# Patient Record
Sex: FEMALE | Race: BLACK OR AFRICAN AMERICAN | Employment: FULL TIME | URBAN - METROPOLITAN AREA
[De-identification: names, ages, dates, MRNs, and addresses within clinical notes are randomized per-mention and may not be internally consistent; named-entity substitution may affect disease eponyms.]

---

## 2018-10-29 ENCOUNTER — THERAPY VISIT (OUTPATIENT)
Dept: PHYSICAL THERAPY | Facility: CLINIC | Age: 43
End: 2018-10-29
Payer: OTHER MISCELLANEOUS

## 2018-10-29 DIAGNOSIS — M25.512 SHOULDER PAIN, LEFT: Primary | ICD-10-CM

## 2018-10-29 PROCEDURE — 97161 PT EVAL LOW COMPLEX 20 MIN: CPT | Mod: GP | Performed by: PHYSICAL THERAPIST

## 2018-10-29 PROCEDURE — 97110 THERAPEUTIC EXERCISES: CPT | Mod: GP | Performed by: PHYSICAL THERAPIST

## 2018-10-29 NOTE — PROGRESS NOTES
[place] [facility] Initial Evaluation  Subjective:  Patient is a 43 year old female presenting with rehab right shoulder hpi.   Christina Salazar is a 43 year old female with a right shoulder condition.  Condition occurred with:  Repetition/overuse (vacuuming).  Condition occurred: at work.  This is a new condition  10/11/18  .    Patient reports pain:  Lateral.  Radiates to:  Shoulder (anterior to posterior).  Quality: clicking/popping. and is constant and reported as 8/10.  Associated with: clicking, popping.   Exacerbated by: reaching above head, behind back, lifting, gripping. and relieved by ice, bracing/immobilizing and rest (sling).  Since onset symptoms are unchanged.  Special tests:  X-ray.      General health as reported by patient is good.  Pertinent medical history includes:  Anemia, depression and smoking (pain at night/rest).        Current occupation is   .            Red flags:  None as reported by the patient.        Pt reports she initially hurt her R elbow in March 2018 when she banged her elbow against a wall. THen on 10/11/18 the pt started vacuuming. When she pulled back she felt immediate sharp pain in the back/side of the shoulder. SHe felt that she pulled something in her shoulder. She had Xrays done that following Saturday, which were negative. SHe was then put in a sling and was told to rest her shoulder for one week. She is unable to return to work on any restrictions.                Objective:  System              Cervical/Thoracic Evaluation  Cervical AROM: normal (Negative Spurling's)                                  Shoulder Evaluation:  ROM:  AROM:  : Light resistance painful with: L shoulder flexion, ABD, IR.  Flexion:  Left:  180    Right:  115  Extension: Left: 180Right: 85                  Flexion/External Rotation:  Left:  T4    Right:  T4  Extension/Internal Rotation:  Left:  T6    Right:  T12            Stability Testing:      Left shoulder stability  negative testing:  Apprehension  Right shoulder stability positive testing:Apprehension  Special Tests:  Special tests assessed shoulder: ABle to perform lift off test for subscap.  Left shoulder positive for the following special tests:  Impingement  Right shoulder positive for the following special tests:Impingement  Palpation:  Palpation assessed shoulder: Diffuse tenderness t/o the shoulder, but very tender on biceps tendon, AC joint, posterior cuff, greater tuberosity.                                         General     ROS    Assessment/Plan:    Patient is a 43 year old female with right side shoulder complaints.    Patient has the following significant findings with corresponding treatment plan.                Diagnosis 1:  R shoulder pain  Pain -  hot/cold therapy  Decreased joint mobility - manual therapy and therapeutic exercise  Decreased strength - therapeutic exercise and therapeutic activities  Impaired muscle performance - neuro re-education  Decreased function - therapeutic activities  Impaired posture - neuro re-education    Therapy Evaluation Codes:   1) History comprised of:   Personal factors that impact the plan of care:      Work status.    Comorbidity factors that impact the plan of care are:      None.     Medications impacting care: None.  2) Examination of Body Systems comprised of:   Body structures and functions that impact the plan of care:      Shoulder.   Activity limitations that impact the plan of care are:      Working.  3) Clinical presentation characteristics are:   Stable/Uncomplicated.  4) Decision-Making    Low complexity using standardized patient assessment instrument and/or measureable assessment of functional outcome.  Cumulative Therapy Evaluation is: Low complexity.    Previous and current functional limitations:  (See Goal Flow Sheet for this information)    Short term and Long term goals: (See Goal Flow Sheet for this information)     Communication ability:  Patient  appears to be able to clearly communicate and understand verbal and written communication and follow directions correctly.  Treatment Explanation - The following has been discussed with the patient:   RX ordered/plan of care  Anticipated outcomes  Possible risks and side effects  This patient would benefit from PT intervention to resume normal activities.   Rehab potential is good.    Frequency:  1 X week, once daily  Duration:  for 6 weeks  Discharge Plan:  Achieve all LTG.  Independent in home treatment program.  Reach maximal therapeutic benefit.    Please refer to the daily flowsheet for treatment today, total treatment time and time spent performing 1:1 timed codes.

## 2018-10-29 NOTE — MR AVS SNAPSHOT
After Visit Summary   10/29/2018    Christina Salazar    MRN: [id number]           Patient Information     Date Of Birth          1975        Visit Information        Provider Department      10/29/2018 9:40 AM Cal Davis [facility]        Today's Diagnoses     Shoulder pain, left    -  1       Follow-ups after your visit        Your next 10 appointments already scheduled     Nov 09, 2018 11:00 AM CST   [facility] Extremity with [facility] ([facility])    [address] [place] [address] [phone]            Nov 16, 2018 11:00 AM CST   [facility] Extremity with Cal MONTEIRO [facility] ([facility])    [address] [place] [address] [phone]            Nov 23, 2018 11:00 AM CST   [facility] Extremity with [facility] ([facility])    [address] [place] [address] [phone]            Nov 30, 2018 11:00 AM CST   [facility] Extremity with Cal MONTEIRO [facility] ([facility])    [address] [place] [address] [phone]              Who to contact     If you have questions or need follow up information about today's clinic visit or your schedule please contact [facility] PHYSICAL THERAP[facility] directly at [phone].  Normal or non-critical lab and imaging results will be communicated to you by MyChart, letter or phone within 4 business days after the clinic has received the results. If you do not hear from us within 7 days, please contact the clinic through [email or web address] or phone. If you have a critical or abnormal lab result, we will notify you by phone as soon as possible.  Submit refill requests through [employer] or call your  "pharmacy and they will forward the refill request to us. Please allow 3 business days for your refill to be completed.          Additional Information About Your Visit        MyChart Information     [employer] lets you send messages to your doctor, view your test results, renew your prescriptions, schedule appointments and more. To sign up, go to [email or web address] . Click on \"Log in\" on the left side of the screen, which will take you to the Welcome page. Then click on \"Sign up Now\" on the right side of the page.     You will be asked to enter the access code listed below, as well as some personal information. Please follow the directions to create your username and password.     Your access code is: [email or web address]  Expires: 2019  6:31 AM     Your access code will  in 90 days. If you need help or a new code, please call your [place] clinic or [phone].        Care EveryWhere ID     This is your Care EveryWhere ID. This could be used by other organizations to access your [place] medical records  [id number]         Blood Pressure from Last 3 Encounters:   No data found for BP    Weight from Last 3 Encounters:   No data found for Wt              We Performed the Following     HC PT EVAL, LOW COMPLEXITY     [facility] INITIAL EVAL REPORT     THERAPEUTIC EXERCISES        Primary Care Provider Office Phone # Fax #    Mikki MONTEIRO AdrianaTyrone [phone]       [facility] [address] [facility] [address]        Equal Access to Services     LILLI VIRGEN : Hadii chapo ku hadasho Sofrancoiseali, waaxda luqadaha, qaybta kaalmada adeegyada, mercy nieto . So [facility] [phone].    ATENCIÓN: Si habla español, tiene a romero disposición servicios gratuitos de asistencia lingüística. Jazmyne al [phone].    We comply with applicable federal civil rights laws and [place] laws. We do not discriminate on the basis of race, color, national origin, age, disability, sex, " sexual orientation, or gender identity.            Thank you!     Thank you for choosing [facility] PHYSICAL THERAPY [facility]  for your care. Our goal is always to provide you with excellent care. Hearing back from our patients is one way we can continue to improve our services. Please take a few minutes to complete the written survey that you may receive in the mail after your visit with us. Thank you!             Your Updated Medication List - Protect others around you: Learn how to safely use, store and throw away your medicines at www.disposemymeds.org.      Notice  As of 10/29/2018  4:27 PM    You have not been prescribed any medications.

## 2018-10-29 NOTE — LETTER
HEALTH PHYSICAL THERAPY [facility]  [address] [address]  [phone]    2018    Re: Christina Salazar   :   1975  MRN:  [id number]   REFERRING PHYSICIAN:   No ref. provider found     HEALTH PHYSICAL THERAPY [facility]  Date of Initial Evaluation:  10/29/18  Visits:  Rxs Used: 1  Reason for Referral:  Shoulder pain, left    [place] [facility] Initial Evaluation  Subjective:  Patient is a 43 year old female presenting with rehab right shoulder hpi.   Christina Salazar is a 43 year old female with a right shoulder condition.  Condition occurred with:  Repetition/overuse (vacuuming).  Condition occurred: at work.  This is a new condition  10/11/18  Patient reports pain:  Lateral.  Radiates to:  Shoulder (anterior to posterior).  Quality: clicking/popping. and is constant and reported as 8/10.  Associated with: clicking, popping.   Exacerbated by: reaching above head, behind back, lifting, gripping. and relieved by ice, bracing/immobilizing and rest (sling).  Since onset symptoms are unchanged.  Special tests:  X-ray.      General health as reported by patient is good.  Pertinent medical history includes:  Anemia, depression and smoking (pain at night/rest).        Current occupation is   Red flags:  None as reported by the patient.    Pt reports she initially hurt her R elbow in 2018 when she banged her elbow against a wall. THen on 10/11/18 the pt started vacuuming. When she pulled back she felt immediate sharp pain in the back/side of the shoulder. SHe felt that she pulled something in her shoulder. She had Xrays done that following Saturday, which were negative. SHe was then put in a sling and was told to rest her shoulder for one week. She is unable to return to work on any restrictions.                Objective:  Cervical/Thoracic Evaluation  Cervical AROM: normal (Negative Spurling's)     Shoulder Evaluation:  ROM:  AROM:  : Light  resistance painful with: L shoulder flexion, ABD, IR.  Flexion:  Left:  180    Right:  115  Extension: Left: 180Right: 85  Flexion/External Rotation:  Left:  T4    Right:  T4  Extension/Internal Rotation:  Left:  T6    Right:  T12    Stability Testing:    Left shoulder stability negative testing:  Apprehension  Right shoulder stability positive testing:Apprehension  Special Tests:  Special tests assessed shoulder: ABle to perform lift off test for subscap.  Left shoulder positive for the following special tests:  Impingement  Re: Christina RE CrooksMarie   :   1975    Right shoulder positive for the following special tests:Impingement  Palpation:  Palpation assessed shoulder: Diffuse tenderness t/o the shoulder, but very tender on biceps tendon, AC joint, posterior cuff, greater tuberosity.    Assessment/Plan:    Patient is a 43 year old female with right side shoulder complaints.    Patient has the following significant findings with corresponding treatment plan.                Diagnosis 1:  R shoulder pain  Pain -  hot/cold therapy  Decreased joint mobility - manual therapy and therapeutic exercise  Decreased strength - therapeutic exercise and therapeutic activities  Impaired muscle performance - neuro re-education  Decreased function - therapeutic activities  Impaired posture - neuro re-education    Therapy Evaluation Codes:   1) History comprised of:   Personal factors that impact the plan of care:      Work status.    Comorbidity factors that impact the plan of care are:      None.     Medications impacting care: None.  2) Examination of Body Systems comprised of:   Body structures and functions that impact the plan of care:      Shoulder.   Activity limitations that impact the plan of care are:      Working.  3) Clinical presentation characteristics are:   Stable/Uncomplicated.  4) Decision-Making    Low complexity using standardized patient assessment instrument and/or measureable assessment of functional  outcome.  Cumulative Therapy Evaluation is: Low complexity.  Previous and current functional limitations:  (See Goal Flow Sheet for this information)    Short term and Long term goals: (See Goal Flow Sheet for this information)   Communication ability:  Patient appears to be able to clearly communicate and understand verbal and written communication and follow directions correctly.  Treatment Explanation - The following has been discussed with the patient:   RX ordered/plan of care  Anticipated outcomes  Possible risks and side effects  This patient would benefit from PT intervention to resume normal activities.   Rehab potential is good.  Frequency:  1 X week, once daily  Duration:  for 6 weeks  Discharge Plan:  Achieve all LTG.  Independent in home treatment program.  Reach maximal therapeutic benefit.  Please refer to the daily flowsheet for treatment today, total treatment time and time spent performing 1:1 timed codes.     Re: Christina Salazar   :   1975    Thank you for your referral.    INQUIRIES  Therapist: Cal Davis DPT  [facility] PHYSICAL THERAPY [address] [address]  Phone: [phone]

## 2018-11-09 ENCOUNTER — THERAPY VISIT (OUTPATIENT)
Dept: PHYSICAL THERAPY | Facility: CLINIC | Age: 43
End: 2018-11-09
Payer: OTHER MISCELLANEOUS

## 2018-11-09 DIAGNOSIS — M25.512 SHOULDER PAIN, LEFT: Primary | ICD-10-CM

## 2018-11-09 PROCEDURE — 97110 THERAPEUTIC EXERCISES: CPT | Mod: GP | Performed by: PHYSICAL THERAPIST

## 2018-11-23 ENCOUNTER — THERAPY VISIT (OUTPATIENT)
Dept: PHYSICAL THERAPY | Facility: CLINIC | Age: 43
End: 2018-11-23
Payer: OTHER MISCELLANEOUS

## 2018-11-23 DIAGNOSIS — M25.512 SHOULDER PAIN, LEFT: Primary | ICD-10-CM

## 2018-11-23 PROCEDURE — 97110 THERAPEUTIC EXERCISES: CPT | Mod: GP | Performed by: PHYSICAL THERAPIST

## 2020-06-29 ENCOUNTER — THERAPY VISIT (OUTPATIENT)
Dept: OCCUPATIONAL THERAPY | Facility: CLINIC | Age: 45
End: 2020-06-29
Payer: COMMERCIAL

## 2020-06-29 DIAGNOSIS — M25.531 RIGHT WRIST PAIN: Primary | ICD-10-CM

## 2020-06-29 DIAGNOSIS — M79.644 PAIN OF RIGHT THUMB: ICD-10-CM

## 2020-06-29 PROCEDURE — 97110 THERAPEUTIC EXERCISES: CPT | Mod: GO | Performed by: OCCUPATIONAL THERAPIST

## 2020-06-29 PROCEDURE — 97760 ORTHOTIC MGMT&TRAING 1ST ENC: CPT | Mod: GO | Performed by: OCCUPATIONAL THERAPIST

## 2020-06-29 PROCEDURE — 97165 OT EVAL LOW COMPLEX 30 MIN: CPT | Mod: GO | Performed by: OCCUPATIONAL THERAPIST

## 2020-06-29 NOTE — PROGRESS NOTES
Hand Therapy Initial Evaluation    Current Date:  6/29/2020    Diagnosis: Right thumb and wrist pain  DOI: About 2 weeks ago (Order: 6/17/20)  Referring provider: Anand Diggs DO    Subjective:  Christina Salazar is a 45 year old female.    Patient reports symptoms of the right wrist and thumb which occurred due to unknown. Since onset symptoms are Unchanged  General health as reported by patient is good.    No past medical history on file.  No past surgical history on file.    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    Previous treatment: Icing  Barriers include:none  Mobility: No difficulty  Transportation: drives  Currently working in normal job without restrictions  Leisure activities/hobbies: Watching TV; has 6 adult children  Other: Pain with gripping and lifting bag of trash to throw away    Functional Outcome Measure:   Upper Extremity Functional Index Score:  SCORE: 38/80   (A lower score indicates greater disability.)      Objective:  Pain Level (Scale 0-10):   6/29/2020   At Rest 10/10   With Use 10/10     Pain Description:  Date 6/29/2020   Location wrist and thumb   Pain Quality Aching   Frequency constant     Pain is worst  daytime or nighttime   Exacerbated by Wrist and thumb movement   Relieved by cold   Progression Unchanged     Sensation   WNL throughout all nerve distributions; per patient report    Edema   - none  + mild    ++ moderate    +++ severe    6/29/2020   1st DC +   Radial Styloid -      ROM  Pain Report: - none  + mild    ++ moderate    +++ severe   Thumb 6/29/2020   AROM (PROM) Right   MP 0/55+    IP 0/100+   RABD 50+   PABD 60+   Opposition Tip of small finger     Wrist 6/29/2020   AROM (PROM) Right   Extension 75   Flexion 75+   RD 20+   UD 45   UD with Th Flex 30+       Strength   (Measured in pounds)  Pain Report: - none  + mild    ++ moderate    +++ severe    6/29/2020 6/29/2020   Trials Left Right   1 53 25+     3 Pt Pinch 6/29/2020 6/29/2020   Trials Left Right   1 10 5+     Special Tests   Pain Report:  - none    + mild    ++ moderate    +++ severe    6/29/2020   Finkelsteins ++   Radial Nerve Tinel's (DRSN) NT   Th CMC Grind NT   Th CMC Passive Retropulsion NT   WHAT test ++     Palpation  Pain Report:  - none    + mild    ++ moderate    +++ severe    6/29/2020   Radial Styloid +   1st DC +   FCR -   Thumb CMC -   PIN Site -   Extensor Wad -     Assessment:  Patient presents with symptoms consistent with diagnosis of the above condition, with conservative intervention.     Patient's limitations or Problem List includes:  Pain, Decreased ROM/motion, Increased edema, Decreased  and Decreased pinch of the right wrist and thumb which interferes with the patient's ability to perform Self Care Tasks (dressing, eating, bathing, hygiene/toileting), Work Tasks, Sleep Patterns, Recreational Activities, Household Chores and Driving  as compared to previous level of function.    Rehab Potential:  Excellent - Return to full activity, no limitations    Patient will benefit from skilled Occupational Therapy to increase ROM,  strength and pinch strength and decrease pain and edema to return to previous activity level and resume normal daily tasks and to reach their rehab potential.    Barriers to Learning:  No barrier    Communication Issues:  Patient appears to be able to clearly communicate and understand verbal and written communication and follow directions correctly.    Chart Review: Chart Review    Identified Performance Deficits: bathing/showering, toileting, dressing, feeding, hygiene and  grooming, driving and community mobility, home establishment and management, meal preparation and cleanup, shopping, sleep, work and leisure activities    Assessment of Occupational Performance:  1-3 Performance Deficits    Clinical Decision Making (Complexity): Low complexity    Treatment Explanation:  The following has been discussed with the patient:  RX ordered/plan of care  Anticipated outcomes  Possible risks and side effects    Plan:  Frequency:  1 X week, once daily  Duration:  for 6 weeks    Treatment Plan:    Modalities:    US   Therapeutic Exercise:    AROM, AAROM, PROM, Tendon Gliding, Blocking, Isotonics and Isometrics  Neuromuscular re-ed:   Nerve Gliding and Kinesiotaping  Manual Techniques:   Friction massage and Myofascial release  Orthotic Fabrication:    Static orthosis  Self Care:    Ergonomic Considerations    Discharge Plan:  Achieve all LTG.  Independent in home treatment program.  Reach maximal therapeutic benefit.    Discharge Plan:    Achieve all LTG.  Independent in home treatment program.  Reach maximal therapeutic benefit.    Home Exercise Program:  Wrist and Thumb AROM  [place] orthosis, full-time at this time  Icing    Next Visit:  Progress AROM  MFR  Re-check thumb trigger symptoms

## 2020-07-17 ENCOUNTER — THERAPY VISIT (OUTPATIENT)
Dept: OCCUPATIONAL THERAPY | Facility: CLINIC | Age: 45
End: 2020-07-17
Payer: COMMERCIAL

## 2020-07-17 DIAGNOSIS — M79.644 PAIN OF RIGHT THUMB: Primary | ICD-10-CM

## 2020-07-17 DIAGNOSIS — M25.531 RIGHT WRIST PAIN: ICD-10-CM

## 2020-07-17 PROCEDURE — 97112 NEUROMUSCULAR REEDUCATION: CPT | Mod: GO | Performed by: OCCUPATIONAL THERAPIST

## 2020-07-17 PROCEDURE — 97110 THERAPEUTIC EXERCISES: CPT | Mod: GO | Performed by: OCCUPATIONAL THERAPIST

## 2020-07-17 PROCEDURE — 97140 MANUAL THERAPY 1/> REGIONS: CPT | Mod: GO | Performed by: OCCUPATIONAL THERAPIST

## 2020-07-17 NOTE — PROGRESS NOTES
SOAP note objective information for 7/17/2020.  Please refer to the daily flowsheet for treatment today, total treatment time and time spent performing 1:1 timed codes.     Diagnosis: Right thumb and wrist pain  DOI: About 2 weeks ago (Order: 6/17/20)  Referring provider: Anand Diggs DO    Pain Level (Scale 0-10):   6/29/2020 7/17/2020     At Rest 10/10 9/10   With Use 10/10 10/10     Pain Description:  Date 6/29/2020   Location wrist and thumb   Pain Quality Aching   Frequency constant     Pain is worst  daytime or nighttime   Exacerbated by Wrist and thumb movement   Relieved by cold   Progression Unchanged     Sensation   WNL throughout all nerve distributions; per patient report    Edema   - none  + mild    ++ moderate    +++ severe    6/29/2020   1st DC +   Radial Styloid -      ROM  Pain Report: - none  + mild    ++ moderate    +++ severe   Thumb 6/29/2020 7/17/2020     AROM (PROM) Right Right   MP 0/55+ 0/54+   IP 0/100+ 0/80+   RABD 50+ 60+   PABD 60+ 50+   Opposition Tip of small finger To DIP joint of small finger     Wrist 6/29/2020   AROM (PROM) Right   Extension 75   Flexion 75+   RD 20+   UD 45   UD with Th Flex 30+       Strength   (Measured in pounds)  Pain Report: - none  + mild    ++ moderate    +++ severe    6/29/2020 6/29/2020   Trials Left Right   1 53 25+     3 Pt Pinch 6/29/2020 6/29/2020   Trials Left Right   1 10 5+     Special Tests   Pain Report:  - none    + mild    ++ moderate    +++ severe    6/29/2020 7/17/2020   Finkelsteins ++ ++  Crepitus in first dorsal compartment   Radial Nerve Tinel's (DRSN) NT NT   Th CMC Grind NT NT   Th CMC Passive Retropulsion NT NT   WHAT test ++ +++     Palpation  Pain Report:  - none    + mild    ++ moderate    +++ severe    6/29/2020   Radial Styloid +   1st DC +   FCR -   Thumb CMC -   PIN Site -   Extensor Wad -

## 2020-07-22 ENCOUNTER — THERAPY VISIT (OUTPATIENT)
Dept: OCCUPATIONAL THERAPY | Facility: CLINIC | Age: 45
End: 2020-07-22
Payer: COMMERCIAL

## 2020-07-22 DIAGNOSIS — M79.644 PAIN OF RIGHT THUMB: ICD-10-CM

## 2020-07-22 DIAGNOSIS — M25.531 RIGHT WRIST PAIN: Primary | ICD-10-CM

## 2020-07-22 PROCEDURE — 97140 MANUAL THERAPY 1/> REGIONS: CPT | Mod: GO | Performed by: OCCUPATIONAL THERAPIST

## 2020-07-22 PROCEDURE — 97110 THERAPEUTIC EXERCISES: CPT | Mod: GO | Performed by: OCCUPATIONAL THERAPIST

## 2020-09-03 PROBLEM — M79.644 PAIN OF RIGHT THUMB: Status: RESOLVED | Noted: 2020-06-29 | Resolved: 2020-09-03

## 2020-09-03 PROBLEM — M25.531 RIGHT WRIST PAIN: Status: RESOLVED | Noted: 2020-06-29 | Resolved: 2020-09-03
